# Patient Record
Sex: FEMALE | Race: WHITE | URBAN - METROPOLITAN AREA
[De-identification: names, ages, dates, MRNs, and addresses within clinical notes are randomized per-mention and may not be internally consistent; named-entity substitution may affect disease eponyms.]

---

## 2017-08-02 ENCOUNTER — HISTORICAL (OUTPATIENT)
Dept: WOUND CARE | Facility: HOSPITAL | Age: 27
End: 2017-08-02

## 2017-08-02 LAB
ALBUMIN SERPL-MCNC: 3.2 GM/DL (ref 3.4–5)
ALBUMIN/GLOB SERPL: 1 RATIO (ref 1–2)
ALP SERPL-CCNC: 62 UNIT/L (ref 45–117)
ALT SERPL-CCNC: 20 UNIT/L (ref 12–78)
AST SERPL-CCNC: 13 UNIT/L (ref 15–37)
BILIRUB SERPL-MCNC: 0.4 MG/DL (ref 0.2–1)
BILIRUBIN DIRECT+TOT PNL SERPL-MCNC: 0.1 MG/DL
BILIRUBIN DIRECT+TOT PNL SERPL-MCNC: 0.3 MG/DL
BUN SERPL-MCNC: 7 MG/DL (ref 7–18)
CALCIUM SERPL-MCNC: 9 MG/DL (ref 8.5–10.1)
CHLORIDE SERPL-SCNC: 108 MMOL/L (ref 98–107)
CO2 SERPL-SCNC: 27 MMOL/L (ref 21–32)
CREAT SERPL-MCNC: 0.6 MG/DL (ref 0.6–1.3)
GLOBULIN SER-MCNC: 3.8 GM/ML (ref 2.3–3.5)
GLUCOSE SERPL-MCNC: 81 MG/DL (ref 74–106)
HAV IGM SERPL QL IA: NONREACTIVE
HBV CORE IGM SERPL QL IA: NONREACTIVE
HBV SURFACE AG SERPL QL IA: NEGATIVE
HCV AB SERPL QL IA: NONREACTIVE
HIV 1+2 AB+HIV1 P24 AG SERPL QL IA: NONREACTIVE
POTASSIUM SERPL-SCNC: 4.1 MMOL/L (ref 3.5–5.1)
PROT SERPL-MCNC: 7 GM/DL (ref 6.4–8.2)
SODIUM SERPL-SCNC: 142 MMOL/L (ref 136–145)

## 2017-08-09 ENCOUNTER — HISTORICAL (OUTPATIENT)
Dept: WOUND CARE | Facility: HOSPITAL | Age: 27
End: 2017-08-09

## 2017-08-17 ENCOUNTER — HISTORICAL (OUTPATIENT)
Dept: ADMINISTRATIVE | Facility: HOSPITAL | Age: 27
End: 2017-08-17

## 2017-08-17 LAB
ERYTHROCYTE [DISTWIDTH] IN BLOOD BY AUTOMATED COUNT: 13.6 % (ref 11.5–14.5)
HCT VFR BLD AUTO: 38.4 % (ref 35–46)
HGB BLD-MCNC: 12.9 GM/DL (ref 12–16)
MCH RBC QN AUTO: 28.9 PG (ref 26–34)
MCHC RBC AUTO-ENTMCNC: 33.6 GM/DL (ref 31–37)
MCV RBC AUTO: 85.9 FL (ref 80–100)
PLATELET # BLD AUTO: 266 X10(3)/MCL (ref 130–400)
PMV BLD AUTO: 11.9 FL (ref 7.4–10.4)
RBC # BLD AUTO: 4.47 X10(6)/MCL (ref 4–5.2)
T PALLIDUM AB SER QL: NONREACTIVE
WBC # SPEC AUTO: 5.4 X10(3)/MCL (ref 4.5–11)

## 2017-08-24 ENCOUNTER — HOSPITAL ENCOUNTER (OUTPATIENT)
Dept: MEDSURG UNIT | Facility: HOSPITAL | Age: 27
End: 2017-08-25
Attending: OBSTETRICS & GYNECOLOGY | Admitting: OBSTETRICS & GYNECOLOGY

## 2017-08-24 LAB
B-HCG SERPL QL: NEGATIVE
GROUP & RH: NORMAL

## 2017-08-25 LAB
ABS NEUT (OLG): 5.79 X10(3)/MCL (ref 2.1–9.2)
ALBUMIN SERPL-MCNC: 2.4 GM/DL (ref 3.4–5)
ALBUMIN/GLOB SERPL: 1 RATIO (ref 1–2)
ALP SERPL-CCNC: 47 UNIT/L (ref 45–117)
ALT SERPL-CCNC: 19 UNIT/L (ref 12–78)
AST SERPL-CCNC: 28 UNIT/L (ref 15–37)
BASOPHILS # BLD AUTO: 0.02 X10(3)/MCL
BASOPHILS NFR BLD AUTO: 0 % (ref 0–1)
BILIRUB SERPL-MCNC: 0.6 MG/DL (ref 0.2–1)
BILIRUBIN DIRECT+TOT PNL SERPL-MCNC: 0.2 MG/DL
BILIRUBIN DIRECT+TOT PNL SERPL-MCNC: 0.4 MG/DL
BUN SERPL-MCNC: 5 MG/DL (ref 7–18)
CALCIUM SERPL-MCNC: 8 MG/DL (ref 8.5–10.1)
CHLORIDE SERPL-SCNC: 111 MMOL/L (ref 98–107)
CO2 SERPL-SCNC: 27 MMOL/L (ref 21–32)
CREAT SERPL-MCNC: 0.5 MG/DL (ref 0.6–1.3)
EOSINOPHIL # BLD AUTO: 0.03 X10(3)/MCL
EOSINOPHIL NFR BLD AUTO: 0 % (ref 0–5)
ERYTHROCYTE [DISTWIDTH] IN BLOOD BY AUTOMATED COUNT: 13.8 % (ref 11.5–14.5)
GLOBULIN SER-MCNC: 3 GM/ML (ref 2.3–3.5)
GLUCOSE SERPL-MCNC: 89 MG/DL (ref 74–106)
HCT VFR BLD AUTO: 34.2 % (ref 35–46)
HGB BLD-MCNC: 11.1 GM/DL (ref 12–16)
IMM GRANULOCYTES # BLD AUTO: 0.01 10*3/UL
IMM GRANULOCYTES NFR BLD AUTO: 0 %
LYMPHOCYTES # BLD AUTO: 1.7 X10(3)/MCL
LYMPHOCYTES NFR BLD AUTO: 20 % (ref 15–40)
MCH RBC QN AUTO: 28.1 PG (ref 26–34)
MCHC RBC AUTO-ENTMCNC: 32.5 GM/DL (ref 31–37)
MCV RBC AUTO: 86.6 FL (ref 80–100)
MONOCYTES # BLD AUTO: 0.9 X10(3)/MCL
MONOCYTES NFR BLD AUTO: 11 % (ref 4–12)
NEUTROPHILS # BLD AUTO: 5.79 X10(3)/MCL
NEUTROPHILS NFR BLD AUTO: 68 X10(3)/MCL
PLATELET # BLD AUTO: 193 X10(3)/MCL (ref 130–400)
PMV BLD AUTO: 11.5 FL (ref 7.4–10.4)
POTASSIUM SERPL-SCNC: 3.7 MMOL/L (ref 3.5–5.1)
PROT SERPL-MCNC: 5.4 GM/DL (ref 6.4–8.2)
RBC # BLD AUTO: 3.95 X10(6)/MCL (ref 4–5.2)
SODIUM SERPL-SCNC: 144 MMOL/L (ref 136–145)
WBC # SPEC AUTO: 8.4 X10(3)/MCL (ref 4.5–11)

## 2017-09-22 ENCOUNTER — HISTORICAL (OUTPATIENT)
Dept: ADMINISTRATIVE | Facility: HOSPITAL | Age: 27
End: 2017-09-22

## 2017-09-22 LAB
APPEARANCE, UA: ABNORMAL
BACTERIA #/AREA URNS AUTO: ABNORMAL /[HPF]
BILIRUB UR QL STRIP: NEGATIVE
COLOR UR: YELLOW
GLUCOSE (UA): NORMAL
HGB UR QL STRIP: 0.2 MG/DL
HYALINE CASTS #/AREA URNS LPF: 0 /[LPF]
KETONES UR QL STRIP: NEGATIVE
LEUKOCYTE ESTERASE UR QL STRIP: 250 LEU/UL
NITRITE UR QL STRIP: NEGATIVE
PH UR STRIP: 5.5 [PH] (ref 4.5–8)
PROT UR QL STRIP: 20 MG/DL
RBC #/AREA URNS AUTO: ABNORMAL /[HPF]
SP GR UR STRIP: 1.02 (ref 1–1.03)
SQUAMOUS #/AREA URNS LPF: >100 /[LPF]
UROBILINOGEN UR STRIP-ACNC: NORMAL
WBC #/AREA URNS AUTO: ABNORMAL /HPF

## 2017-10-20 ENCOUNTER — HISTORICAL (OUTPATIENT)
Dept: ADMINISTRATIVE | Facility: HOSPITAL | Age: 27
End: 2017-10-20

## 2017-10-20 LAB
APPEARANCE, UA: CLEAR
BACTERIA #/AREA URNS AUTO: ABNORMAL /[HPF]
BILIRUB UR QL STRIP: NEGATIVE
COLOR UR: YELLOW
GLUCOSE (UA): NORMAL
HGB UR QL STRIP: 0.1 MG/DL
HYALINE CASTS #/AREA URNS LPF: ABNORMAL /[LPF]
KETONES UR QL STRIP: NEGATIVE
LEUKOCYTE ESTERASE UR QL STRIP: NEGATIVE
NITRITE UR QL STRIP: NEGATIVE
PH UR STRIP: 5.5 [PH] (ref 4.5–8)
PROT UR QL STRIP: 10 MG/DL
RBC #/AREA URNS AUTO: ABNORMAL /[HPF]
SP GR UR STRIP: 1.02 (ref 1–1.03)
SQUAMOUS #/AREA URNS LPF: ABNORMAL /[LPF]
UROBILINOGEN UR STRIP-ACNC: NORMAL
WBC #/AREA URNS AUTO: ABNORMAL /HPF

## 2017-10-22 LAB — FINAL CULTURE: NO GROWTH

## 2022-04-11 ENCOUNTER — HISTORICAL (OUTPATIENT)
Dept: ADMINISTRATIVE | Facility: HOSPITAL | Age: 32
End: 2022-04-11

## 2022-04-29 VITALS
DIASTOLIC BLOOD PRESSURE: 85 MMHG | DIASTOLIC BLOOD PRESSURE: 81 MMHG | SYSTOLIC BLOOD PRESSURE: 138 MMHG | HEIGHT: 65 IN | SYSTOLIC BLOOD PRESSURE: 121 MMHG | HEIGHT: 65 IN | BODY MASS INDEX: 35.01 KG/M2 | OXYGEN SATURATION: 99 % | WEIGHT: 211.88 LBS | WEIGHT: 210.13 LBS | BODY MASS INDEX: 35.3 KG/M2

## 2022-04-30 NOTE — H&P
Patient:   Katarzyna Yan             MRN: [id number]            FIN: [id number]               Age:   26 years     Sex:  Female     :  1990   Associated Diagnoses:   None   Author:   Jocelyn Jennings MD have reviewed the history and physical as detailed below with the patient. She denies any interval changes in medications or allergies. She states that she started her menstrual cycle yesterday. She has voiced a clear understanding of the plan and has no additional questions. She desires to proceed. Presents this morning with her .     Patient: Katarzyna Yan ([id number])    HPI: 25yo  who referred for abnormal uterine bleeding and fibroids. Reports that prior to her pregnancy in  her cycles were very heavy and associated with a lot dysmenorrhea. When she got pregnant she notes that it was the best that she has ever felt. Since that time she has had menorrhagia and up to two weeks of bleeding at a time. The provider who referred her suspected endometriosis and noted some large fibroids and discussed a possible myomectomy with her. On imaging there are 5 distinct fibroids with the largest measuring 5x5cm. The patient desires future fertility. The pain is affected her quality of life. She cannot sit on the toilet or sit for prolonged periods with without pain and pressure in her rectum and pelvis. Pain with intercourse, primarily deep penetration. Currently on OCPs and Ibuprofen with some improvement. Patient has outside records scanned in to chart (referred by Dr. Rito Garcia).   PMH: Chronic pelvic pain, obesity  PSH: Tonsillectomy  OBHx:  x1  GynHx: 10//7-14days, Denies h/o abnormal paps or STDS  FHx: Denies family history of breast, ovarian, colon or endometrial cancer  Social: Denies tobacco, drug, or alcohol use  All: NKDA  Meds: Sprintec, Motrin    Objective:  T: 36.8, BP: 145/88, P: 73, R: 18, Wt: 216#, Ht: 165cm  BMI: 36  General Appearance:Alert, cooperative, no distress,  appears stated age  Back: No CVA tenderness  Lungs: Clear to auscultation bilaterally, respirations unlabored  Heart:Regular rate and rhythm, S1 and S2 normal, no murmur, rub or gallop  Abdomen: Soft, diffusely tender, bowel sounds active all four quadrants, mass palpated in the RLQ and below the umbilicus  : Normal perineal body and urethral meatus . Normal external vulva without lesion, moist rugated vaginal mucosa with no discharge. Levator strengh 4/5 and non tense. No urethral tendernes. However remained of pelvic exam diffuse tenderness with mass palpated in posterior fornix. No CMT. Cervix without lesions. Bimanual exam with 14 week size uterus, globular lesion left in the midline and towards the right. Thorough bimanual limited bu pain.  Rectal:Normal tone, large posterior uterine mass or fibroid palpated.   Extremities: Extremities normal, atraumatic, no cyanosis or edema  Pulses: 2+ and symmetric all extremities  Skin: Skin color, texture, turgor normal, no rashes or lesions    Labs:   Pap NEM/HPV negative (8/2017)  EMB Not indicated  MMG Not indicated  Chest xray Not indicated  Cr .6    Imaging:   MRI (8/2017)  FINDINGS:     Uterus: Anteverted measuring 12.1 x 9.1 x 8.6 cm. The endometrium is  not thickened, measures only about 3 mm maximally. There are numerous  uterine masses which enhance heterogeneously. These are most  compatible with fibroids. The largest intramural fibroid anteriorly  toward lower uterine segment measures up to about 4.5 cm. Some of the  smaller fibroids probably do abut the endometrial cavity which could  be a source of bleeding.     Adnexa: There is a simple appearing cyst of the right ovary measuring  up to 3 cm. Otherwise only small follicles are seen bilaterally. No  adnexal mass.     Urinary tract: Mild bladder wall thickening. This is probably  accentuated by under distention. No hydroureter. Normal urethra.     GI tract: Normal.     Lymph nodes: No pelvic adenopathy.      Bones/soft tissues: No suspicious marrow signal abnormality.     No free fluid.     IMPRESSION:  Enlarged uterus with multiple masses most compatible with fibroids as  discussed.    Assessment: 27yo  with symptomatic fibroids and endometriosis presents for pre-op evaluation.    Counseling: Alternatives to this planned procedure were explained to the patient including expectant, medical and other surgical management. This procedure and its risks, reasons, benefits and complications (including injury to bowel, bladder, major blood vessel, ureter, bleeding, possibility of transfusion, infection, scarring, dyspareunia, erosion, further surgery, incontinence, failure of the procedure or fistula formation) were reviewed in detail. Additional risks specific to this patient and procedure include injury to visceral and vascular structures secondary to normal anatomy distortion, as well as large volume blood loss resulting in hysterectomy. Discussed with patient that it will not be possible to remove all of her fibroids, we will target those that are larger than 3cm in size in particular. Patient counseled on risk of blood transfusion including but not limited to allergic reaction, transmission HIV, Hep C 1:2million, transmission Hep B 1:250,000.    Plan:  *Surgical consents signed. Preop testing ordered. Surgery case requested. Instructions reviewed, including NPO after midnight. Patient given date and time for E[facility] appointment.  *Follow-up pre-op labs  *Type and Screen the morning of surgery  *Ancef 2g On call to OR  *Vasopressin 20U/100mL and 400mcg of cytotec MN in OR  *SCDs for DVT Prophylaxis  * Surgical plan: abdominal myomectomy, possible removal of endometriosis on      Addendum by Kade WILKES, May S on 2017 16:20 CDT (Verified)  I have seen this patient 17 and agree with note above.    Informed consent obtained, specifically I reiterated the risks of:   pain, infection, bleeding,  need for transfusion, leaving behind fibroids (*anticipate this will occur since her uterus is entirely composed of fibroids), damage to nearby organs and need for repair or additional surgery, delayed complications, or other unpredicted risks.    Specifically I explained to her that there are risks of uterine rupture that increase with leaving little myometrium.  Based on her images, I anticipate that she will have fibroids left behind, and we will do our best to remove those >3-4cm.  She had all of her questions answered and is agreeable to this.  She will definitely need a  section in the future.  Patient able to describe planned procedure in her own words.  Expected recovery time reviewed, as well as normal postoperative course.    Addendum by Kade WILKES, May S on 2017 16:24 CDT (Verified)  Also risks of finding malignancy and upstaging of cancer was discussed, but she has known about these for sometime.  I explained given her age, this is very low risk.

## 2022-05-05 NOTE — HISTORICAL [facility] CERNER
This is a historical note converted from [employer]. Formatting and pictures may have been removed.  Please reference [employer] for original formatting and attached multimedia. Indication for Surgery  Symptomatic uterine fibroids/Chronic pelvic pain  Preoperative Diagnosis  Symptomatic uterine fibroids/Chronic pelvic pain, Desired fertility  Postoperative Diagnosis  Same, Leiomyomatosis  Operation  Abdominal Myomectomy (>25 removed)  Prevena Incisional Vacuum  Surgeon(s)  Staff: Linda Braun MD  Primary: Jocelyn Jennings MD  Assistant  MD Alexi Alvarado MD  Anesthesia  General Endotracheal  Estimated Blood Loss  250mL  ?  IVF  1800mL  Urine Output  100mL  Findings  EUA: normal external genitalia, normal vaginal mucosa noted at the introitus,?14-16 week?size uterus, anteverted and boggy without distinct masses, no adnexal masses or fullness  ?  Intraop: right adnexa lodged with in the right posterior cul de sac. 10-12 weeks uterus.?Two larger anterior and one posterior subserosal fibroids. Innumerable, sub centimeter leiomyomas throughout the myometrium and involving the right uterosacral ligament. No evidence of pelvic adhesive disease. Bilateral normal ovaries and tubes  Specimen(s)  Uterine fibroids  Complications  None  Technique  The patient was taken to the OR with IVF running. She was transferred to the OR table and general anesthesia was obtained without difficulty. Patient was then position in dorsal lithotomy using the Gaurang stirrups. 400mcg of?Cytotec?per rectum were placed.?Patient was prepped and draped in the normal sterile fashion.  ?   A Pfannensteil skin incision was made approximately two finger breadths above the level of the pubic symphysis. The incision was carried down to the level of the fascia using the Bovie. The fascia was nicked with the Bovie and the fascial incision was extended out laterally on both sides using the Bovie. The Kika clamps were used to undermine this fascia  for this step. The underlying rectus muscle was dissected off bluntly and taken off the fascia in the midline?using the curved Bovie superiorly and inferiorly. The muscle was then  in the midline using the curved Bovie. A substantial amount of preperitoneal fat was noted, but the peritoneum was able to be?grasped and elevated with hemostats entered?sharply with Metzenbaum scissors. The peritoneum was then take down inferiorly in the midline with the Bovie?and subsequently stretched superiorly and laterally. The bladder was noted to be down and out of the immediate?operating field. A survey was performed and the pelvic viscera with the above noted findings. An Campbell retractor was placed and protruding bowel was packed away with moist, tagged?laparotomy sponges.  ?   The uterus could not be easily exteriorized at this time and the attention was turned to the anterior aspect of the uterus. Vasopressin 20U in 100mL was injected along the midline which was then scored and entered with the 15 blade.?A well demarcated fibroid capsule was identified. With constant traction/counter traction, the plane of the fibroids margins were developed and the fibroid?was excised without difficulty using the curved Tyler scissors. A second similarly sized fibroid was removed in a similar manner from the anterior body of the uterus. The uterus was then able to be exteriorized.?At this time, it was noted that the endometrium had been entered (~2cm) and this defect was closed with a figure of eight stitch using 2-0 Vicryl.?The incision on the anterior aspect of the uterus was then extended to remove a larger fundal fibroid. Total incision length approximated 7cm. Innumerable sub centimeter leiomyomas were removed from the myometrium with Allis clamps and curved Tyler scissors. The myometrium was then?closed with a series of figure of eight stitches using?0-Vicryl. An additional defect at the inferior edge of the decision was closed  with a purse string stitch using 0-Vicryl. Attention was then turned to the posterior aspect of the uterus.?Vasopressin was again injected and an approximately 5cm incision was made in the midline. Two larger and adjacent fibroids were removed from the myometrium near the lower segment in the same fashion as described above. Sub centimeter leiomyomas were also noted to be distributed in a similar manner posteriorly and removed with the Allis clamps and curved Tyler scissors. The posterior incision myometrium was?closed with a series of figure of eight stitches using 0-Vicryl. The serosa of both incisions was reapproximated with 2-0 Vicryl in a continuous running?fashion.  ?   Hemostasis was noted to be excellent with Bovie. Uterus was placed back in to the abdomen. The pelvis was copiously irrigated. There was no distinct bleeding noted. Interceed was placed over both serosal?incisions.?At the time of closure, it is estimated that the patient was left with 10-12 week?sized uterus. The laparotomy sponges and Campbell were removed.  ?  The peritoneum was reapproximated in a continuous running fashion with a 2-0 Vicryl. The fascia was closed in a continuous running fashion using #1 PDS from left to right and right to left overlapping in the middle.?Subcutaneous fat was irrigated and 0 Vicryl interrupted were placed.??The skin was closed with staples. A Prevena incision vacuum was placed over the wound. Instrument and sponge count correct x 2. The patient tolerated the procedure well and was extubated without difficulty. She was taken to recovery in stable condition.  ?  ?Kade?was present and scrubbed for the entirety of the procedure.   *Corrections:? midline rectus muscle entry was performed bluntly and sharply (bovie not used for this separation)  Endometrium entered was 3mm* (not cm) at most.? Closed over with 3-0 Vicryl.?? Posterior myometrial incision was 5cm and right lateral.? Due to the extensive  leiomyomatosis of her myometrium and that it was not possible to remove all of them and leave any normal myometrium, the decision was made to stop at removal of all myomas >3cm.? No further fibroids were palpable or visible that would necessitate removal at this point.? Also note there was visible small 3mm leiomyomas studding the right uterosacral ligament supporting the diagnosis of leiomyomatosis.  Two separate #1 PDS sutures were run from each side?over the rectus fascia for closure and tied in the midline separately.  ?  This certifies that I was personally scrubbed and assisted?throughout the?entire procedure.   She will need a  delivery for any future pregnancies due to depth of myometrial incisions.? Recommend at least 6 months of reliable contraception from here.

## 2022-05-05 NOTE — HISTORICAL [facility] CERNER
This is a historical note converted from [employer]. Formatting and pictures may have been removed.  Please reference [employer] for original formatting and attached multimedia. Admission Information  Scheduled abdominal myomectomy secondary to symptomatic fibroids. Patient with desired fertility preservation as well. Underwent procedure as planned without complication. Pain adequately controlled on POD#1, tolerating regular diet without N/V, ambulating at baseline and voiding spontaneously. Hemodynamically stable with?appropriate labs. Stable for discharge home on POD#1.  Hospital Course  Significant Findings  EUA: normal external genitalia, normal vaginal mucosa noted at the introitus,?14-16 week?size uterus, anteverted and boggy without distinct masses, no adnexal masses or fullness  ?   Intraop: right adnexa lodged with in the right posterior cul de sac. 10-12 weeks uterus.?Two larger anterior and one posterior subserosal fibroids. Innumerable, sub centimeter leiomyomas throughout the myometrium and involving the right uterosacral ligament. No evidence of pelvic adhesive disease. Bilateral normal ovaries and tubes  Procedures and Treatment Provided  Abdominal myomectomy  Prevena incisional vacuum  Physical Exam  Vitals & Measurements  ?T:?37.0? ?C ?(Oral)? TMIN:?36.8? ?C ?(Oral)? TMAX:?37.1? ?C ?(Temporal Artery)? HR:?73?(Monitored)? RR:?16? BP:?118/73? SpO2:?99%?  ?Physical Exam  ?P 70-80, Tmax 37.1C, -120/60-80s, RR 16, Sao2 99% on RA, UOP 107mL/h for last 7h  General_Well nourished, AAOX3, NAD  Neck_Normal range of motion, no obvious masses  Respiratory_Effort normal with no accessory muscle usage, lungs CTAB  Cardiovascular_RRR, S1/2 present with no added sounds  Gastrointestinal_Abdomen soft/appropriately tender, no organomegaly noted, wound vac in place and working  Genitourinary_normal external genitalia, normal urethral meatus,?cervix and vaginal mucosa?normal in appearance, uterus normal in  size/shape/consistency, no adnexal masses/fullness/tenderness, no bleeding/discharge  Musculoskeletal_No swelling or edema of limbs  Neurologic_AAOx3, behavior normal, affect normal  Discharge Plan  Home on POD#1 with pain medications, Prevena incisional vac?and outpatient follow-up.  Patient Discharge Condition  Stable; good  Discharge Disposition  Home on POD#1 with

## 2022-05-05 NOTE — HISTORICAL [facility] CERNER
This is a historical note converted from [employer]. Formatting and pictures may have been removed.  Please reference [employer] for original formatting and attached multimedia. Subjective  27 y/o  now day 1 s/p abdominal myomectomy.  ?  Patient in bed with good pain control. Not yet ambulating. Voiding by serrano. Is tolerating full liquids. Not yet passed flatus. Denies HA/CP/SOB/N/V/F/C.  Review of Systems  Constitutional_denies weight changes; denies fever/chills; denies nausea/vomiting  Respiratory_denies difficulty breathing  Cardiovascular_denies chest pain  Gastrointestinal_denies abdominal pain; denies changes in bowel habits  Genitourinary_denies vaginal bleeding; denies vaginal discharge  Hema/Lymph_denies any bleeding  Musculoskeletal_denies muscle aches or pain  Neurologic_denies headaches  All Other ROS_negative?except HPI  Objective  Vitals & Measurements  T:?37.0? ?C ?(Oral)? TMIN:?36.8? ?C ?(Oral)? TMAX:?37.1? ?C ?(Temporal Artery)? HR:?73?(Monitored)? RR:?16? BP:?118/73? SpO2:?99%?  Physical Exam  P 70-80, Tmax 37.1C, -120/60-80s, RR 16, Sao2 99% on RA, UOP 107mL/h for last 7h  ?   General_Well nourished, AAOX3, NAD  Neck_Normal range of motion, no obvious masses  Respiratory_Effort normal with no accessory muscle usage, lungs CTAB  Cardiovascular_RRR, S1/2 present with no added sounds  Gastrointestinal_Abdomen soft/appropriately tender, no organomegaly noted, wound vac in place and working  Genitourinary_normal external genitalia, normal urethral meatus,?cervix and vaginal mucosa?normal in appearance, uterus normal in size/shape/consistency, no adnexal masses/fullness/tenderness, no bleeding/discharge  Musculoskeletal_No swelling or edema of limbs  Neurologic_AAOx3, behavior normal, affect normal  Lab Results  H/H 12.9/38.4 from   CBC pending this AM  Diagnostic Results  None  Assessment/Plan  27 y/o  now day 1 s/po abdominal myomectomy, doing well overall  ?  1. Routine  postoperative care, Incisional vac will remain in place for five days  2. Serrano in place, monitor I/Os. D/C serrano this AM  3. Pain Control: Morphine PCA and Scheduled Toradol.? Will transition to PO medication when tolerating a regular diet  4. H/H 12.9/38.4 pre-op.? CBC And CMP pending for the morning.? EBL 300cc  5. Clear liquid diet, Advance as tolerated. Ambulated Ad Ne  6. PPX: SCDs, Pepcid  Dispo: discharge home today if stable and goals met   Exam Genitourinary: No bleeding noted   Reviewed the patients medical history, residents findings on physical exam, and the diagnosis with treatment plan. I saw patient on rounds. Care provided was reasonable and necessary.  Pt doing well.? Routine advances.